# Patient Record
Sex: FEMALE | Race: WHITE | NOT HISPANIC OR LATINO | Employment: FULL TIME | URBAN - METROPOLITAN AREA
[De-identification: names, ages, dates, MRNs, and addresses within clinical notes are randomized per-mention and may not be internally consistent; named-entity substitution may affect disease eponyms.]

---

## 2017-05-30 ENCOUNTER — OFFICE VISIT (OUTPATIENT)
Dept: RETAIL CLINIC | Facility: CLINIC | Age: 27
End: 2017-05-30

## 2017-05-30 VITALS
RESPIRATION RATE: 12 BRPM | DIASTOLIC BLOOD PRESSURE: 74 MMHG | TEMPERATURE: 99.2 F | HEART RATE: 90 BPM | OXYGEN SATURATION: 99 % | SYSTOLIC BLOOD PRESSURE: 104 MMHG

## 2017-05-30 DIAGNOSIS — R05.9 COUGH: ICD-10-CM

## 2017-05-30 DIAGNOSIS — J30.2 SEASONAL ALLERGIC RHINITIS, UNSPECIFIED ALLERGIC RHINITIS TRIGGER: Primary | ICD-10-CM

## 2017-05-30 PROCEDURE — 99213 OFFICE O/P EST LOW 20 MIN: CPT | Performed by: FAMILY MEDICINE

## 2017-05-30 RX ORDER — FLUTICASONE PROPIONATE 50 MCG
2 SPRAY, SUSPENSION (ML) NASAL DAILY
Qty: 1 BOTTLE | Refills: 0
Start: 2017-05-30

## 2017-05-30 RX ORDER — MONTELUKAST SODIUM 10 MG/1
10 TABLET ORAL NIGHTLY
Qty: 90 TABLET | Refills: 0
Start: 2017-05-30 | End: 2020-01-20

## 2017-05-30 RX ORDER — CETIRIZINE HYDROCHLORIDE 10 MG/1
10 TABLET ORAL DAILY
COMMUNITY

## 2018-01-23 ENCOUNTER — HOSPITAL ENCOUNTER (OUTPATIENT)
Dept: LAB | Facility: HOSPITAL | Age: 28
Discharge: HOME | End: 2018-01-23
Attending: INTERNAL MEDICINE | Admitting: INTERNAL MEDICINE

## 2018-01-23 LAB
ALBUMIN SERPL-MCNC: 4.7 G/DL (ref 3.5–4.8)
ALBUMIN/GLOB SERPL: 1.7 {RATIO} (ref 1–1.7)
ALP SERPL-CCNC: 55 IU/L (ref 32–91)
ALT SERPL-CCNC: 16 IU/L (ref 14–54)
ANION GAP SERPL CALC-SCNC: 14.1 MMOL/L (ref 10–20)
AST SERPL-CCNC: 24 IU/L (ref 15–41)
BASOPHILS # BLD AUTO: 0.1 10*3/UL (ref 0–0.2)
BASOPHILS NFR BLD AUTO: 1 % (ref 0–2)
BILIRUB SERPL-MCNC: 0.8 MG/DL (ref 0.3–1.2)
BUN SERPL-MCNC: 10 MG/DL (ref 8–20)
BUN/CREAT SERPL: 14.3 (ref 5.4–26.2)
CALCIUM SERPL-MCNC: 9.6 MG/DL (ref 8.9–10.3)
CHLORIDE SERPL-SCNC: 102 MMOL/L (ref 101–111)
CONV CO2: 26 MMOL/L (ref 22–32)
CONV TOTAL PROTEIN: 7.4 G/DL (ref 6.1–7.9)
CREAT UR-MCNC: 0.7 MG/DL (ref 0.4–1)
DIFFERENTIAL METHOD BLD: (no result)
EOSINOPHIL # BLD AUTO: 0.1 10*3/UL (ref 0–0.3)
EOSINOPHIL # BLD AUTO: 1 % (ref 0–3)
ERYTHROCYTE [DISTWIDTH] IN BLOOD BY AUTOMATED COUNT: 14 % (ref 11.5–14.5)
GLOBULIN UR ELPH-MCNC: 2.7 G/DL (ref 2.5–3.8)
GLUCOSE SERPL-MCNC: 88 MG/DL (ref 65–99)
HCT VFR BLD AUTO: 41 % (ref 35–49)
HGB BLD-MCNC: 13.5 G/DL (ref 12–15)
LYMPHOCYTES # BLD AUTO: 2.4 10*3/UL (ref 0.8–4.8)
LYMPHOCYTES NFR BLD AUTO: 36 % (ref 18–42)
MCH RBC QN AUTO: 26.9 PG (ref 26–32)
MCHC RBC AUTO-ENTMCNC: 33 G/DL (ref 32–36)
MCV RBC AUTO: 81.7 FL (ref 80–94)
MONOCYTES # BLD AUTO: 0.5 10*3/UL (ref 0.1–1.3)
MONOCYTES NFR BLD AUTO: 7 % (ref 2–11)
NEUTROPHILS # BLD AUTO: 3.6 10*3/UL (ref 2.3–8.6)
NEUTROPHILS NFR BLD AUTO: 55 % (ref 50–75)
NRBC BLD AUTO-RTO: 0 /100{WBCS}
NRBC/RBC NFR BLD MANUAL: 0 10*3/UL
PLATELET # BLD AUTO: 246 10*3/UL (ref 150–450)
PMV BLD AUTO: 10.1 FL (ref 7.4–10.4)
POTASSIUM SERPL-SCNC: 4.1 MMOL/L (ref 3.6–5.1)
RBC # BLD AUTO: 5.02 10*6/UL (ref 4–5.4)
SODIUM SERPL-SCNC: 138 MMOL/L (ref 136–144)
WBC # BLD AUTO: 6.7 10*3/UL (ref 4.5–11.5)

## 2018-02-05 ENCOUNTER — HOSPITAL ENCOUNTER (OUTPATIENT)
Dept: CARDIOLOGY | Facility: HOSPITAL | Age: 28
Discharge: HOME | End: 2018-02-05
Attending: INTERNAL MEDICINE | Admitting: INTERNAL MEDICINE

## 2019-05-28 ENCOUNTER — HOSPITAL ENCOUNTER (OUTPATIENT)
Dept: GENERAL RADIOLOGY | Facility: HOSPITAL | Age: 29
Discharge: HOME | End: 2019-05-28
Attending: PHYSICIAN ASSISTANT | Admitting: PHYSICIAN ASSISTANT

## 2020-01-20 ENCOUNTER — OFFICE VISIT (OUTPATIENT)
Dept: SURGERY | Facility: CLINIC | Age: 30
End: 2020-01-20

## 2020-01-20 VITALS
TEMPERATURE: 97.5 F | OXYGEN SATURATION: 99 % | HEIGHT: 66 IN | DIASTOLIC BLOOD PRESSURE: 71 MMHG | HEART RATE: 64 BPM | SYSTOLIC BLOOD PRESSURE: 116 MMHG | BODY MASS INDEX: 26.03 KG/M2 | WEIGHT: 162 LBS

## 2020-01-20 DIAGNOSIS — D22.9 NEVUS: Primary | ICD-10-CM

## 2020-01-20 PROBLEM — M54.16 LUMBAR RADICULOPATHY: Status: ACTIVE | Noted: 2019-05-28

## 2020-01-20 PROCEDURE — 11402 EXC TR-EXT B9+MARG 1.1-2 CM: CPT | Performed by: SURGERY

## 2020-01-20 PROCEDURE — 88305 TISSUE EXAM BY PATHOLOGIST: CPT | Performed by: SURGERY

## 2020-01-20 PROCEDURE — 99201 PR OFFICE OUTPATIENT NEW 10 MINUTES: CPT | Performed by: SURGERY

## 2020-01-20 NOTE — PROGRESS NOTES
Subjective   Taylor Verduzco is a 29 y.o. female.     History of present illness  Taylor is a pleasant 29-year-old seen today for removal of a abnormal nevus.  This been present since she was 15 but has changed recently.  She would like to have it removed.    Past Medical History:   Diagnosis Date   • Depression    • Pneumonia        Past Surgical History:   Procedure Laterality Date   • LAPAROSCOPIC APPENDECTOMY     • LAPAROSCOPIC APPENDECTOMY         Outpatient Encounter Medications as of 1/20/2020   Medication Sig Dispense Refill   • cetirizine (zyrTEC) 10 MG tablet Take 10 mg by mouth Daily.     • fluticasone (FLONASE) 50 MCG/ACT nasal spray 2 sprays into each nostril Daily. Administer 2 sprays in each nostril daily. 1 bottle 0   • levonorgestrel (MIRENA) 20 MCG/24HR IUD 1 each by Intrauterine route 1 (One) Time.     • [DISCONTINUED] montelukast (SINGULAIR) 10 MG tablet Take 1 tablet by mouth Every Night. 90 tablet 0     No facility-administered encounter medications on file as of 1/20/2020.        No Known Allergies    Family History   Problem Relation Age of Onset   • Arthritis Mother    • Heart disease Mother    • Hypertension Mother    • Diabetes Father    • Mental illness Sister        Social History     Socioeconomic History   • Marital status: Single     Spouse name: Not on file   • Number of children: Not on file   • Years of education: Not on file   • Highest education level: Not on file   Tobacco Use   • Smoking status: Never Smoker   • Smokeless tobacco: Never Used   Substance and Sexual Activity   • Alcohol use: Yes   • Drug use: No   • Sexual activity: Yes     Partners: Male       The following portions of the patient's history were reviewed and updated as appropriate: allergies, current medications, past family history, past medical history, past social history, past surgical history and problem list.    Objective       Assessment/Plan   There are no diagnoses linked to this encounter.    In  the office today the area is prepped with Betadine, anesthetized with 1% lidocaine with epinephrine and elliptically excised.  Stitch is  placed at 12:00 for orientation purposes for the pathologist.  3-0 Prolene is used to close the wound.  4 interrupted stitches are required.  She tolerated the procedure well..    Impression: Nevus, 1.1 cm size diameter    Plan: Stitches to stay for 14 days           Victor Manuel Luque,   1/20/2020  4:39 PM

## 2020-01-22 LAB
LAB AP CASE REPORT: NORMAL
LAB AP CLINICAL INFORMATION: NORMAL
PATH REPORT.FINAL DX SPEC: NORMAL
PATH REPORT.GROSS SPEC: NORMAL

## 2020-11-19 ENCOUNTER — APPOINTMENT (OUTPATIENT)
Dept: CT IMAGING | Facility: HOSPITAL | Age: 30
End: 2020-11-19

## 2020-11-19 ENCOUNTER — HOSPITAL ENCOUNTER (EMERGENCY)
Facility: HOSPITAL | Age: 30
Discharge: HOME | End: 2020-11-19
Attending: EMERGENCY MEDICINE | Admitting: EMERGENCY MEDICINE

## 2020-11-19 VITALS
TEMPERATURE: 97.2 F | BODY MASS INDEX: 25.71 KG/M2 | HEART RATE: 90 BPM | WEIGHT: 160 LBS | SYSTOLIC BLOOD PRESSURE: 115 MMHG | HEIGHT: 66 IN | DIASTOLIC BLOOD PRESSURE: 84 MMHG | OXYGEN SATURATION: 98 % | RESPIRATION RATE: 20 BRPM

## 2020-11-19 DIAGNOSIS — S09.90XA INJURY OF HEAD, INITIAL ENCOUNTER: Primary | ICD-10-CM

## 2020-11-19 DIAGNOSIS — S16.1XXA STRAIN OF NECK MUSCLE, INITIAL ENCOUNTER: ICD-10-CM

## 2020-11-19 PROCEDURE — 72125 CT NECK SPINE W/O DYE: CPT

## 2020-11-19 PROCEDURE — 70450 CT HEAD/BRAIN W/O DYE: CPT

## 2020-11-19 PROCEDURE — 99283 EMERGENCY DEPT VISIT LOW MDM: CPT

## 2020-11-19 RX ORDER — ACETAMINOPHEN 500 MG
1000 TABLET ORAL ONCE
Status: COMPLETED | OUTPATIENT
Start: 2020-11-19 | End: 2020-11-19

## 2020-11-19 RX ADMIN — ACETAMINOPHEN 1000 MG: 500 TABLET, FILM COATED ORAL at 21:44

## 2020-11-20 NOTE — ED PROVIDER NOTES
EMERGENCY DEPARTMENT ENCOUNTER    Room Number:  39/39  Date of encounter:  11/19/2020  PCP: Provider, No Known  Historian: Patient      HPI:  Chief Complaint: Head injury and neck injury concerned about a concussion  A complete HPI/ROS/PMH/PSH/SH/FH are unobtainable due to: Not applicable  Context: Taylor Verduzco is a 30 y.o. female who presents to the ED c/o injury to the patient's head and neck.  Patient was lifting up a couch and her body was an angle the couch went down and it caused her body to rotate around the couch with a whiplash injury which she thinks she might hit her head mildly against the seat of the couch.  With the arm of the couch hitting her upper abdomen and lower chest.  She has no pain in her abdomen or chest that 100% resolved.  She has had persistent throbbing headache.  She also has a mild pain in the back of her upper portion of her neck.  She has no motor or sensory changes.  No visual change.  Complains of some photophobia she states that she feels a little bit dizzy.  She has no visual change, speech change, she is able to stand and walk without any problems.  She is concerned of the whiplash injury that she has a concussion.  She does not take any blood thinning medicine.        Previous Episodes: No  Current Symptoms: See above    MEDICAL HISTORY REVIEWED        PAST MEDICAL HISTORY  Active Ambulatory Problems     Diagnosis Date Noted   • Lumbar radiculopathy 05/28/2019   • Nevus 01/20/2020     Resolved Ambulatory Problems     Diagnosis Date Noted   • No Resolved Ambulatory Problems     Past Medical History:   Diagnosis Date   • Asthma    • Depression    • Pneumonia          PAST SURGICAL HISTORY  Past Surgical History:   Procedure Laterality Date   • LAPAROSCOPIC APPENDECTOMY           FAMILY HISTORY  Family History   Problem Relation Age of Onset   • Arthritis Mother    • Heart disease Mother    • Hypertension Mother    • Diabetes Father    • Mental illness Sister           SOCIAL HISTORY  Social History     Socioeconomic History   • Marital status: Single     Spouse name: Not on file   • Number of children: Not on file   • Years of education: Not on file   • Highest education level: Not on file   Tobacco Use   • Smoking status: Never Smoker   • Smokeless tobacco: Never Used   Substance and Sexual Activity   • Alcohol use: Yes   • Drug use: No   • Sexual activity: Yes     Partners: Male         ALLERGIES  Patient has no known allergies.        REVIEW OF SYSTEMS  Review of Systems     All systems reviewed and negative except for those discussed in HPI.       PHYSICAL EXAM    I have reviewed the triage vital signs and nursing notes.    ED Triage Vitals [11/19/20 2035]   Temp Heart Rate Resp BP SpO2   97.2 °F (36.2 °C) 90 20 115/84 98 %      Temp src Heart Rate Source Patient Position BP Location FiO2 (%)   Oral -- -- -- --       GENERAL: Female no acute distress.Vital signs on my initial evaluation unremarkable  HENT: nares patent  Head/neck/ face are symmetric without gross deformity, signs of trauma, or swelling patient no signs of trauma to the head or neck.  EYES: no scleral icterus, no conjunctival pallor.  NECK: Supple, no meningismus she does point to some pain at the very top portion at the base of her occipital scalp.  No focal midline cervical spine tenderness.  CV: regular rhythm, regular rate with intact distal pulses.  RESPIRATORY: normal effort and no respiratory distress.  ABDOMEN: soft and non-tender.  He has no pain in palpation of the chest abdomen and pelvis.  MUSCULOSKELETAL: no deformity.  NEURO: alert and appropriate, moves all extremities, follows commands.  Note and oriented x3.  No focal motor or sensory changes.  Patient's gait when she ambulated back to the room was normal without any unsteadiness or ataxia.  SKIN: warm, dry    Vital signs and nursing notes reviewed.        LAB RESULTS  No results found for this or any previous visit (from the past 24  hour(s)).    Ordered the above labs and independently reviewed the results.        RADIOLOGY  Ct Head Without Contrast    Result Date: 11/19/2020  CT HEAD WITHOUT CONTRAST  HISTORY: Persistent throbbing headache following trauma  COMPARISON: None available.  TECHNIQUE: Axial CT imaging was obtained through the brain. No IV contrast was administered.  FINDINGS: No acute intracranial hemorrhage is seen. The ventricles are normal in size. There is no midline shift or mass effect. No calvarial fracture is seen. The paranasal sinuses and mastoid air cells appear clear.      No acute intracranial findings.  Radiation dose reduction techniques were utilized, including automated exposure control and exposure modulation based on body size.  This report was finalized on 11/19/2020 10:45 PM by Dr. Renee Smith M.D.      Ct Cervical Spine Without Contrast    Result Date: 11/19/2020  CT CERVICAL SPINE WITHOUT CONTRAST   HISTORY: Trauma. Possible whiplash injury.  COMPARISON: None available.  TECHNIQUE: Axial CT imaging was obtained through the cervical spine. Coronal and sagittal reformatted images were obtained.  FINDINGS: No acute fracture or subluxation of the cervical spine is identified. There is some reversal/straightening of normal cervical curvature. This may be related to patient positioning, although some muscle spasm is not excluded. There is no prevertebral soft tissue swelling. There may be some mild canal narrowing at C6-C7. There is some minimal neural foraminal narrowing noted bilaterally, right greater than left.  The thyroid gland trachea appear unremarkable. Images through the lung apices do not demonstrate any acute abnormalities.      No acute fracture or subluxation identified. There is some reversal/screening of normal cervical curvature. Appearance may be related to patient positioning, although some degree of muscle spasm is not excluded. Radiation dose reduction techniques were utilized, including  automated exposure control and exposure modulation based on body size.  This report was finalized on 11/19/2020 10:50 PM by Dr. Renee Smith M.D.        I ordered the above noted radiological studies. Reviewed by me and discussed with radiologist.  See dictation for official radiology interpretation.      PROCEDURES    Procedures      MEDICATIONS GIVEN IN ER    Medications   acetaminophen (TYLENOL) tablet 1,000 mg (1,000 mg Oral Given 11/19/20 5267)         PROGRESS, DATA ANALYSIS, CONSULTS, AND MEDICAL DECISION MAKING    We will check a CT scan of her head and neck.  Think she might of had a whiplash injury.  Patient also is requesting a CT scan as well.  We are currently under a pandemic from the COVID19 infection.  The patient presented to the emergency department by ambulance or personal vehicle. I followed the current protocols required by Infection Control at [facility] in my evaluation and treatment of the patient. The patient was wearing a face mask during my evaluation and throughout my encounter. During my whole encounter with this patient I used appropriate personal protective equipment.  This equipment consisted of eye protection, facemask, gown, and gloves.  I applied this equipment before entering the room.      All labs have been independently reviewed by me.  All radiology studies have been reviewed by me and discussed with radiologist dictating the report.   EKG's independently viewed and interpreted by me.  Discussion below represents my analysis of pertinent findings related to patient's condition, differential diagnosis, treatment plan and final disposition.      ED Course as of Nov 19 2257   Thu Nov 19, 2020 2255 CT scan of the head and cervical spine were unremarkable.  I reviewed the report from the radiologist.  Please see dictated report for complete dictation.    [MM]   2256 Patient is resting comfortably.  When I go back to the room patient is sleeping.  I woke her  and informed her the results of the CT scan of the head and neck.  She states that she is feeling better.  All questions answered.    [MM]      ED Course User Index  [MM] Edilson Chase MD       AS OF 22:57 EST VITALS:    BP - 115/84  HR - 90  TEMP - 97.2 °F (36.2 °C) (Oral)  02 SATS - 98%        DIAGNOSIS  Final diagnoses:   Injury of head, initial encounter   Strain of neck muscle, initial encounter         DISPOSITION  DISCHARGE    Patient discharged in stable condition.    Reviewed implications of results, diagnosis, meds, responsibility to follow up, warning signs and symptoms of possible worsening, potential complications and reasons to return to ER, including worsening of symptoms, worsening of headache, visual change, speech change, weakness in extremity, any concerns..    Patient/Family voiced understanding of above instructions.    Discussed plan for discharge, as there is no emergent indication for admission. Pt/family is agreeable and understands need for follow up and repeat testing.  Pt is aware that discharge does not mean that nothing is wrong but it indicates no emergency is present that requires admission and they must continue care with follow-up as given below or physician of their choice.     FOLLOW-UP  PATIENT LIAISON [facility] [id number] [phone]    Follow-up with your physician in the next week or call the number above to pain and new primary care provider in follow-up in the next 1 to 2 weeks., Return if pain worsens, If symptoms worsen, shortness of breath, any concerns, fever         Medication List      No changes were made to your prescriptions during this visit.                  Edilson Chase MD  11/19/20 1921       Edilson Chase MD  11/19/20 6539

## 2020-11-20 NOTE — ED TRIAGE NOTES
.Pt masked on arrival, staff masked    Pt reports fell earlier today, landed on chest/ribs but concerned for concussion d/t jaw/head pain

## 2020-11-20 NOTE — DISCHARGE INSTRUCTIONS
Take Tylenol or Motrin for pain.  Apply warm compresses to upper neck to help relax smooth muscles.  Return if any weakness to upper extremities or lower extremities, visual change, speech change, worsening of condition or any concerns.

## 2021-04-16 ENCOUNTER — BULK ORDERING (OUTPATIENT)
Dept: CASE MANAGEMENT | Facility: OTHER | Age: 31
End: 2021-04-16

## 2021-04-16 DIAGNOSIS — Z23 IMMUNIZATION DUE: ICD-10-CM

## 2021-04-20 ENCOUNTER — APPOINTMENT (OUTPATIENT)
Dept: WOMENS IMAGING | Facility: HOSPITAL | Age: 31
End: 2021-04-20

## 2021-04-20 PROCEDURE — G0279 TOMOSYNTHESIS, MAMMO: HCPCS | Performed by: RADIOLOGY

## 2021-04-20 PROCEDURE — 77062 BREAST TOMOSYNTHESIS BI: CPT | Performed by: RADIOLOGY

## 2021-04-20 PROCEDURE — 77066 DX MAMMO INCL CAD BI: CPT | Performed by: RADIOLOGY

## 2021-04-20 PROCEDURE — 76641 ULTRASOUND BREAST COMPLETE: CPT | Performed by: RADIOLOGY

## 2021-04-28 ENCOUNTER — TELEPHONE (OUTPATIENT)
Dept: SURGERY | Facility: CLINIC | Age: 31
End: 2021-04-28

## 2021-04-28 NOTE — TELEPHONE ENCOUNTER
New patient appointment with Nicol Baldwin NP is scheduled on 6/28/2021 @ 3:30pm.    Called and spoke to patient, patient expressed v/u of appointment time.    Sent patient a reminder letter in the mail.

## 2021-05-19 ENCOUNTER — TELEPHONE (OUTPATIENT)
Dept: SURGERY | Facility: CLINIC | Age: 31
End: 2021-05-19

## 2021-05-19 NOTE — TELEPHONE ENCOUNTER
Patient called back tor rescehdule appointment with Nicol Baldwin NP.    Appointment is scheduled on 7/26/2021 @ 3:00pm,    Patient expressed v/u of appointment time.    Sent patient a new set of paperwork in mail.

## 2021-07-27 ENCOUNTER — OFFICE VISIT (OUTPATIENT)
Dept: SURGERY | Facility: CLINIC | Age: 31
End: 2021-07-27

## 2021-07-27 VITALS
BODY MASS INDEX: 27.64 KG/M2 | WEIGHT: 172 LBS | SYSTOLIC BLOOD PRESSURE: 115 MMHG | DIASTOLIC BLOOD PRESSURE: 76 MMHG | HEART RATE: 63 BPM | OXYGEN SATURATION: 98 % | HEIGHT: 66 IN

## 2021-07-27 DIAGNOSIS — R92.2 BREAST DENSITY: ICD-10-CM

## 2021-07-27 DIAGNOSIS — N60.11 FIBROCYSTIC BREAST CHANGES, BILATERAL: Primary | ICD-10-CM

## 2021-07-27 DIAGNOSIS — N60.12 FIBROCYSTIC BREAST CHANGES, BILATERAL: Primary | ICD-10-CM

## 2021-07-27 PROBLEM — R92.30 BREAST DENSITY: Status: ACTIVE | Noted: 2021-07-27

## 2021-07-27 PROCEDURE — 99203 OFFICE O/P NEW LOW 30 MIN: CPT | Performed by: NURSE PRACTITIONER

## 2021-07-27 RX ORDER — SERTRALINE HYDROCHLORIDE 100 MG/1
100 TABLET, FILM COATED ORAL DAILY
COMMUNITY
Start: 2021-05-27

## 2021-07-27 NOTE — PROGRESS NOTES
[facility]     Referring Provider: Candi Wiseman MD     Chief complaint: breast mass. Right breast.      HPI: Ms. Taylor Verduzco is a 30 yo woman, seen at the request of Candi Wiseman MD, for right breast lump    I personally reviewed her records and summarized her relevant breast history/imaging:    She has no personal history of breast procedures and denies any family history of breast or ovarian cancer.     3/26/2021: Clinic visit with CHAVEZ Ruiz  Presents for well woman preventative exam.  Reports breast lump/mass occurring for 1 day.  The lump/mass is located in the right breast.  There are no changes to the nipple.  With exam 12 to 13 mm, mobile, nonerythematous mass right upper outer quadrant.  Covid vaccine was in January.  Advised patient most breast lumps are benign.  Follow-up pending results of imaging.    4/20/2021: [facility] with tom[facility]ynthesis[facility]  Breasts are heterogeneously dense.  Finding 1: There are areas of asymmetric breast tissue seen in both breast.  No suspicious masses, suspicious microcalcifications or architectural distortions are identified.  There is no mammographic evidence of malignancy.  Finding 2: The patient indicates palpable lump or thickening in the upper outer region of the right breast at 1030, 10 cm from the nipple.  The symptomatic region is clearly marked there is no suspicious finding in the region of clinical concern.  Right breast complete ultrasound  Finding 2: Normal-appearing subcutaneous fat lobule measuring approximately 2 cm is noted in the area of concern.  There is no evidence of any solid mass or abnormal cystic element at this site, or in the remainder of the right breast.  Finding 3: There is an oval small intramammary lymph node with well-defined, thin margins measuring 9 x 3 x 8 mm seen in 1030 o'clock region of the right breast located 8 cm from the  nipple.  This is an incidental, nonpalpable finding.  All 4 quadrants the breast, axilla, retroareolar region were imaged.  Impression:  Finding 1: Areas of asymmetric breast tissue in both breasts are benign negative.  Finding 2: Area in the right breast is benign negative.  In view of these findings clinical follow-up is recommended as needed.  Finding 3: Intramammary lymph node in the right breast is benign negative.  Screening mammogram age 40 is recommended.  BI-RADS Category 2      Today she presents with an unchanged right breast nodule, non tender.  She denies any breast pain, skin changes, or nipple discharge.  She denies any prior history of abnormal mammograms or breast biopsies.   She reports that she is otherwise healthy and does not take any medications.   She was by herself in clinic today.       Review of Systems   Neurological: Positive for headaches.   Hematological: Bruises/bleeds easily.   Psychiatric/Behavioral: Positive for depression. The patient is nervous/anxious.    All other systems reviewed and are negative.      Medications:    Current Outpatient Medications:   •  cetirizine (zyrTEC) 10 MG tablet, Take 10 mg by mouth Daily., Disp: , Rfl:   •  fluticasone (FLONASE) 50 MCG/ACT nasal spray, 2 sprays into each nostril Daily. Administer 2 sprays in each nostril daily., Disp: 1 bottle, Rfl: 0  •  levonorgestrel (MIRENA) 20 MCG/24HR IUD, 1 each by Intrauterine route 1 (One) Time., Disp: , Rfl:   •  sertraline (ZOLOFT) 100 MG tablet, Take 100 mg by mouth Daily., Disp: , Rfl:     Allergies:  No Known Allergies    Medical history:  Past Medical History:   Diagnosis Date   • Asthma    • Depression    • Pneumonia        Surgical History:  Past Surgical History:   Procedure Laterality Date   • LAPAROSCOPIC APPENDECTOMY         Family History:  Family History   Problem Relation Age of Onset   • Arthritis Mother    • Heart disease Mother    • Hypertension Mother    • Diabetes Father    • Mental illness  Sister    • Kidney cancer Paternal Grandmother        Social History:   Social History     Socioeconomic History   • Marital status: Single     Spouse name: Not on file   • Number of children: Not on file   • Years of education: Not on file   • Highest education level: Not on file   Tobacco Use   • Smoking status: Never Smoker   • Smokeless tobacco: Never Used   Substance and Sexual Activity   • Alcohol use: Yes   • Drug use: No   • Sexual activity: Yes     Partners: Male     Patient drinks 2 servings of caffeine per day.       GYNECOLOGIC HISTORY:   . P: 0. AB: 0.  Last menstrual period: Has IUD.   Age at menarche: 13  Age at first childbirth: n/a  Lactation/How long: n/a  Age at menopause: n/a  Total years of oral contraceptive use: 5  Total years of hormone replacement therapy: n/a      Physical Exam  Vitals:    21 1451   BP: 115/76   Pulse: 63   SpO2: 98%     ECOG 0 - Asymptomatic  General: NAD, well appearing  Psych: a&o x 3, normal mood and affect  Eyes: EOMI, no scleral icterus  ENMT: neck supple without masses or thyromegaly, mucus membranes moist  Resp: normal effort, CTAB  CV: RRR, no murmurs, no edema   GI: soft, NT, ND  MSK: normal gait, normal ROM in bilateral shoulders  Lymph nodes:  no cervical, supraclavicular or axillary lymphadenopathy  Breast: symmetric, diffusely nodular bilaterally  Right:  No visible abnormalities on inspection while seated, with arms raised or hands on hips. No masses, skin changes, or nipple abnormalities.  At the area of concern, UOQ, 1000, 9 CFN is a smooth nodule, 1 cm, nontender.  Left:  No visible abnormalities on inspection while seated, with arms raised or hands on hips. No masses, skin changes, or nipple abnormalities.      Assessment:    1)  31 y.o. F with a palpable area of concern right breast, negative diagnostic imaging    2)  Benign breast changes    3)  Breast density    Discussion:  1)  Imaging findings were reviewed with the patient.    2)  We  discussed fibrocystic change and how this is benign and can sometimes be associated with increased breast density. I reassured her today that she had a normal clinical breast exam and recent normal bilateral imaging. I explained that cords of dense breast tissue or focal areas of fibrocystic change can sometimes feel like a lump on exam. This is common in women like her with heterogeneous and nodular tissue. I encouraged her to become familiar with her own self-exam over the next few months to establish a personal baseline.    3)  Breast density describes how the breasts look on a mammogram.  Breast and connective tissue are denser than fat and this difference shows up on the mammogram.  Young women often have dense breasts.  As we age, breast become less dense.  Dense breast can make it harder to find breast cancer on the mammogram.  Women with high breast density have an increased risk of breast cancer.  Educational materials regarding breast density were given and reviewed.  Tomosynthesis imaging will be completed with next screening study.      Plan:    In view of her normal imaging and examination I will not give her a follow-up in our office.  She is followed by her referring at this juncture.  Screening mammogram with tomosynthesis at age 41.    I have advised the patient to continue monthly breast self examination and to notify us if she develops new breast symptoms.       CHAVEZ Quiles           CC:  Candi Wiseman MD  Provider, No Known    EMR Dragon/transcription disclaimer:  Dictated using Dragon dictation

## 2021-09-02 ENCOUNTER — APPOINTMENT (OUTPATIENT)
Dept: GENERAL RADIOLOGY | Facility: HOSPITAL | Age: 31
End: 2021-09-02

## 2021-09-02 ENCOUNTER — HOSPITAL ENCOUNTER (EMERGENCY)
Facility: HOSPITAL | Age: 31
Discharge: HOME | End: 2021-09-02
Admitting: EMERGENCY MEDICINE

## 2021-09-02 VITALS
SYSTOLIC BLOOD PRESSURE: 114 MMHG | TEMPERATURE: 98.3 F | WEIGHT: 176.15 LBS | RESPIRATION RATE: 16 BRPM | HEART RATE: 81 BPM | DIASTOLIC BLOOD PRESSURE: 81 MMHG | HEIGHT: 67 IN | BODY MASS INDEX: 27.65 KG/M2 | OXYGEN SATURATION: 100 %

## 2021-09-02 DIAGNOSIS — M25.571 ACUTE RIGHT ANKLE PAIN: ICD-10-CM

## 2021-09-02 DIAGNOSIS — S99.911A INJURY OF RIGHT ANKLE, INITIAL ENCOUNTER: Primary | ICD-10-CM

## 2021-09-02 PROCEDURE — 99283 EMERGENCY DEPT VISIT LOW MDM: CPT

## 2021-09-02 PROCEDURE — 73610 X-RAY EXAM OF ANKLE: CPT

## 2021-09-02 PROCEDURE — 25010000002 KETOROLAC TROMETHAMINE PER 15 MG: Performed by: NURSE PRACTITIONER

## 2021-09-02 PROCEDURE — 96372 THER/PROPH/DIAG INJ SC/IM: CPT

## 2021-09-02 RX ORDER — HYDROCODONE BITARTRATE AND ACETAMINOPHEN 5; 325 MG/1; MG/1
1 TABLET ORAL EVERY 6 HOURS PRN
Qty: 3 TABLET | Refills: 0 | Status: SHIPPED | OUTPATIENT
Start: 2021-09-02

## 2021-09-02 RX ORDER — KETOROLAC TROMETHAMINE 30 MG/ML
30 INJECTION, SOLUTION INTRAMUSCULAR; INTRAVENOUS ONCE
Status: COMPLETED | OUTPATIENT
Start: 2021-09-02 | End: 2021-09-02

## 2021-09-02 RX ADMIN — KETOROLAC TROMETHAMINE 30 MG: 30 INJECTION, SOLUTION INTRAMUSCULAR; INTRAVENOUS at 21:37

## 2021-09-03 ENCOUNTER — HOSPITAL ENCOUNTER (OUTPATIENT)
Dept: MRI IMAGING | Facility: HOSPITAL | Age: 31
Discharge: HOME | End: 2021-09-03
Admitting: NURSE PRACTITIONER

## 2021-09-03 ENCOUNTER — TRANSCRIBE ORDERS (OUTPATIENT)
Dept: ADMINISTRATIVE | Facility: HOSPITAL | Age: 31
End: 2021-09-03

## 2021-09-03 DIAGNOSIS — M25.571 RIGHT ANKLE PAIN, UNSPECIFIED CHRONICITY: ICD-10-CM

## 2021-09-03 DIAGNOSIS — M25.571 RIGHT ANKLE PAIN, UNSPECIFIED CHRONICITY: Primary | ICD-10-CM

## 2021-09-03 PROCEDURE — 73721 MRI JNT OF LWR EXTRE W/O DYE: CPT

## 2021-09-03 NOTE — ED PROVIDER NOTES
Subjective    Chief Complaint   Patient presents with   • Ankle Injury     Provider, No Known  No LMP recorded. Patient has had an implant.  No Known Allergies    History of Present Illness  Patient is a 31-year-old female presents emergency department with complaint of right ankle pain.  Patient ports she was walking, twisted her ankle.  Denies any pain in the foot.  No numbness or tingling.    Patient denies any possibility of pregnancy.  She has Mirena IUD.  Review of Systems   Constitutional: Negative for chills and fever.   Musculoskeletal:        Right ankle pain   Skin: Negative for color change and wound.       Past Medical History:   Diagnosis Date   • Asthma    • Depression    • Pneumonia        No Known Allergies    Past Surgical History:   Procedure Laterality Date   • LAPAROSCOPIC APPENDECTOMY         Family History   Problem Relation Age of Onset   • Arthritis Mother    • Heart disease Mother    • Hypertension Mother    • Diabetes Father    • Mental illness Sister    • Kidney cancer Paternal Grandmother        Social History     Socioeconomic History   • Marital status: Single     Spouse name: Not on file   • Number of children: Not on file   • Years of education: Not on file   • Highest education level: Not on file   Tobacco Use   • Smoking status: Never Smoker   • Smokeless tobacco: Never Used   Substance and Sexual Activity   • Alcohol use: Yes   • Drug use: No   • Sexual activity: Yes     Partners: Male           Objective   Physical Exam  Vitals and nursing note reviewed.   Constitutional:       General: She is not in acute distress.     Appearance: Normal appearance. She is not ill-appearing, toxic-appearing or diaphoretic.   Eyes:      Extraocular Movements: Extraocular movements intact.      Conjunctiva/sclera: Conjunctivae normal.      Pupils: Pupils are equal, round, and reactive to light.   Cardiovascular:      Rate and Rhythm: Normal rate and regular rhythm.      Heart sounds: Normal heart  "sounds.   Pulmonary:      Effort: Pulmonary effort is normal.   Musculoskeletal:      Right ankle: Swelling present. Tenderness present. Normal range of motion. Normal pulse.      Left ankle: Normal pulse.        Feet:       Comments: DP pulses 2+ bilaterally.    Skin:     General: Skin is warm and dry.      Capillary Refill: Capillary refill takes less than 2 seconds.      Findings: No erythema or rash.   Neurological:      General: No focal deficit present.      Mental Status: She is alert and oriented to person, place, and time.   Psychiatric:         Mood and Affect: Mood normal.         Behavior: Behavior normal.         Procedures           ED Course      /81 (BP Location: Left arm, Patient Position: Sitting)   Pulse 81   Temp 98.3 °F (36.8 °C) (Oral)   Resp 16   Ht 170.2 cm (67\")   Wt 79.9 kg (176 lb 2.4 oz)   SpO2 100%   BMI 27.59 kg/m²   Labs Reviewed - No data to display  Medications   ketorolac (TORADOL) injection 30 mg (30 mg Intramuscular Given 9/2/21 2137)     XR Ankle 3+ View Right    Result Date: 9/2/2021  1. Negative for evidence of acute fracture or dislocation.  Electronically Signed By-Tracy Ruelas MD On:9/2/2021 9:42 PM This report was finalized on [id number] by  Tracy Ruelas MD.         Patient INSPECT report queried and reviewed.  I discussed with the patient the risk and benefits associated with opiate/narcotic pain medication today.  Based on patient's exam findings and assessment, I do feel it appropriate to prescribe a short course of opiate/ narcotic pain medication from the emergency department.  The patient was advised of the risks associated with such medication, including risk of dependency.  Patient was advised of medication administration instructions, appropriate use, potential side effects and adverse reactions.  Acknowledged and verbalized understanding, and agrees to treatment.                                MDM  Number of Diagnoses or Management Options   "   Amount and/or Complexity of Data Reviewed  Tests in the radiology section of CPT®: reviewed      Appropriate PPE was worn during the duration of the care for this patient while in the emergency department per [facility] Policy    ----Differentials: Sprain, occult fracture, contusion  This list is not all inclusive and does not constitute the entireity of considered causes.     ----Lab and imaging reviewd by me, imaging interpreted per radiologist and independly viewed by me:     No acute fracture noted on x-ray.    ED  Course----Patient was brought back to the emergency department room for evaluation and placed on appropriate monitoring.      Patient has no acute fracture on x-ray.  However given swelling, mechanism of injury, diffuse tenderness in the ankle, patient was placed in Ace wrap, given crutches, as be nonweightbearing and follow-up for reexam and possible imaging.  Reports she will see Dr. Wiseman.  He was given information for Ortho as well as Dr. Wong.  Discussed pain medication, patient feels that she can take Tylenol and ibuprofen, but if the pain is severe, only request 3 hydrocodone until follow-up.    Vital signs have  been reviewed. Patient is afebrile. Non toxic in appearance. Alert and oriented to person, place, time and situation.    I spoke with the patient at the bedside regarding their plan of care, discharge instruction, home care, prescriptions, and importance follow-up.  We discussed test results at the bedside, including incidental abnormal labs, radiological findings, understands need for follow-up with primary care or specialist if indicated.      Patient was made aware of indications to return to the emergency department.  Patient agrees with the current plan of care for discharge, verbalized understanding of all instructions    Pt is aware that discharge does not mean that nothing is wrong but it indicates no emergency is present and they must continue care with follow-up as  given below or physician of their choice                          Final diagnoses:   Injury of right ankle, initial encounter   Acute right ankle pain       ED Disposition  ED Disposition     ED Disposition Condition Comment    Discharge Stable           [facility] EMERGENCY DEPARTMENT  [address] [address] [phone]    As needed, If symptoms worsen    PATIENT CONNECTION - [facility] [address]  [phone]  Schedule an appointment as soon as possible for a visit   Call for assistance with follow up with Primary care provider-call tomorrow.    EMELIA Wong DPM  [address]  [place] [address]  [phone]          Bry Damon MD  [address]  [place] [facility]  [phone]               Medication List      New Prescriptions    HYDROcodone-acetaminophen 5-325 MG per tablet  Commonly known as: NORCO  Take 1 tablet by mouth Every 6 (Six) Hours As Needed for Moderate Pain .           Where to Get Your Medications      These medications were sent to [id number] - [place], [place] - [address] AT [facility] [phone] [facility] [phone] [place] [address], [facility] [address]    Phone: [phone]   · HYDROcodone-acetaminophen 5-325 MG per tablet          Candi Jay, APRN  09/03/21 0034

## 2021-09-03 NOTE — DISCHARGE INSTRUCTIONS
Please follow-up with your primary care provider, call tomorrow for an appointment, if you do not have a primary care provider, please use the patient connection above to us to help establish care, please call as soon as possible.    Return the emergency department for new or worsening symptoms    Take medications as prescribed, any changes will be noted on your discharge instruction    Ice to area 3-4 times per day, 15-20 minutes at a time, do not use while sleeping or for extended periods of time.     Please follow-up with Orthopedic provider as above or of choice, call for an appointment tomorrow    Use crutches for no weightbearing    Keep Ace wrap in place

## 2025-03-17 ENCOUNTER — ON CAMPUS - OUTPATIENT (OUTPATIENT)
Dept: URBAN - METROPOLITAN AREA HOSPITAL 2 | Facility: HOSPITAL | Age: 35
End: 2025-03-17
Payer: COMMERCIAL

## 2025-03-17 ENCOUNTER — OFFICE (OUTPATIENT)
Dept: URBAN - METROPOLITAN AREA PATHOLOGY 19 | Facility: PATHOLOGY | Age: 35
End: 2025-03-17
Payer: COMMERCIAL

## 2025-03-17 VITALS
RESPIRATION RATE: 14 BRPM | SYSTOLIC BLOOD PRESSURE: 128 MMHG | RESPIRATION RATE: 15 BRPM | HEART RATE: 86 BPM | SYSTOLIC BLOOD PRESSURE: 106 MMHG | RESPIRATION RATE: 24 BRPM | DIASTOLIC BLOOD PRESSURE: 69 MMHG | HEIGHT: 66 IN | DIASTOLIC BLOOD PRESSURE: 81 MMHG | TEMPERATURE: 98.1 F | SYSTOLIC BLOOD PRESSURE: 116 MMHG | DIASTOLIC BLOOD PRESSURE: 80 MMHG | SYSTOLIC BLOOD PRESSURE: 121 MMHG | RESPIRATION RATE: 18 BRPM | DIASTOLIC BLOOD PRESSURE: 77 MMHG | RESPIRATION RATE: 16 BRPM | WEIGHT: 178 LBS | DIASTOLIC BLOOD PRESSURE: 68 MMHG | SYSTOLIC BLOOD PRESSURE: 95 MMHG | HEART RATE: 79 BPM | HEART RATE: 72 BPM | OXYGEN SATURATION: 100 % | HEART RATE: 60 BPM | DIASTOLIC BLOOD PRESSURE: 90 MMHG | HEART RATE: 78 BPM

## 2025-03-17 DIAGNOSIS — K31.89 OTHER DISEASES OF STOMACH AND DUODENUM: ICD-10-CM

## 2025-03-17 DIAGNOSIS — K44.9 DIAPHRAGMATIC HERNIA WITHOUT OBSTRUCTION OR GANGRENE: ICD-10-CM

## 2025-03-17 DIAGNOSIS — R10.13 EPIGASTRIC PAIN: ICD-10-CM

## 2025-03-17 DIAGNOSIS — K21.9 GASTRO-ESOPHAGEAL REFLUX DISEASE WITHOUT ESOPHAGITIS: ICD-10-CM

## 2025-03-17 DIAGNOSIS — R13.10 DYSPHAGIA, UNSPECIFIED: ICD-10-CM

## 2025-03-17 PROBLEM — K29.70 GASTRITIS, UNSPECIFIED, WITHOUT BLEEDING: Status: ACTIVE | Noted: 2025-03-17

## 2025-03-17 PROCEDURE — 88305 TISSUE EXAM BY PATHOLOGIST: CPT | Performed by: PATHOLOGY

## 2025-03-17 PROCEDURE — 43239 EGD BIOPSY SINGLE/MULTIPLE: CPT | Performed by: INTERNAL MEDICINE

## 2025-03-17 PROCEDURE — 43450 DILATE ESOPHAGUS 1/MULT PASS: CPT | Performed by: INTERNAL MEDICINE

## 2025-03-17 RX ORDER — ESOMEPRAZOLE MAGNESIUM 40 MG/1
40 CAPSULE, DELAYED RELEASE ORAL
Qty: 90 | Refills: 4 | Status: ACTIVE
Start: 2025-03-17

## 2025-03-18 ENCOUNTER — OFFICE VISIT (OUTPATIENT)
Dept: FAMILY MEDICINE CLINIC | Facility: CLINIC | Age: 35
End: 2025-03-18
Payer: COMMERCIAL

## 2025-03-18 VITALS
WEIGHT: 174 LBS | HEIGHT: 67 IN | TEMPERATURE: 98 F | OXYGEN SATURATION: 100 % | BODY MASS INDEX: 27.31 KG/M2 | HEART RATE: 60 BPM | DIASTOLIC BLOOD PRESSURE: 76 MMHG | SYSTOLIC BLOOD PRESSURE: 122 MMHG

## 2025-03-18 DIAGNOSIS — Z13.0 SCREENING FOR DEFICIENCY ANEMIA: ICD-10-CM

## 2025-03-18 DIAGNOSIS — M54.16 LUMBAR RADICULOPATHY: ICD-10-CM

## 2025-03-18 DIAGNOSIS — F41.9 ANXIETY: ICD-10-CM

## 2025-03-18 DIAGNOSIS — K21.9 GASTROESOPHAGEAL REFLUX DISEASE, UNSPECIFIED WHETHER ESOPHAGITIS PRESENT: ICD-10-CM

## 2025-03-18 DIAGNOSIS — L40.9 PSORIASIS: ICD-10-CM

## 2025-03-18 DIAGNOSIS — Z13.6 SCREENING FOR ISCHEMIC HEART DISEASE: ICD-10-CM

## 2025-03-18 DIAGNOSIS — J45.20 MILD INTERMITTENT ASTHMA WITHOUT COMPLICATION: Primary | ICD-10-CM

## 2025-03-18 DIAGNOSIS — F51.02 ADJUSTMENT INSOMNIA: ICD-10-CM

## 2025-03-18 PROBLEM — D22.9 NEVUS: Status: RESOLVED | Noted: 2020-01-20 | Resolved: 2025-03-18

## 2025-03-18 PROBLEM — R92.30 BREAST DENSITY: Status: RESOLVED | Noted: 2021-07-27 | Resolved: 2025-03-18

## 2025-03-18 PROCEDURE — 99204 OFFICE O/P NEW MOD 45 MIN: CPT | Performed by: STUDENT IN AN ORGANIZED HEALTH CARE EDUCATION/TRAINING PROGRAM

## 2025-03-18 RX ORDER — OMEPRAZOLE 40 MG/1
40 CAPSULE, DELAYED RELEASE ORAL DAILY
Qty: 90 CAPSULE | Refills: 3 | Status: SHIPPED | OUTPATIENT
Start: 2025-03-18

## 2025-03-18 RX ORDER — CYCLOBENZAPRINE HCL 5 MG
5 TABLET ORAL 3 TIMES DAILY PRN
Qty: 30 TABLET | Refills: 2 | Status: SHIPPED | OUTPATIENT
Start: 2025-03-18

## 2025-03-18 RX ORDER — ALBUTEROL SULFATE 90 UG/1
1-2 INHALANT RESPIRATORY (INHALATION)
COMMUNITY
Start: 2025-01-29

## 2025-03-18 RX ORDER — MAGNESIUM GLUCONATE 27 MG(500)
500 TABLET ORAL 2 TIMES DAILY
COMMUNITY

## 2025-03-18 NOTE — PROGRESS NOTES
Chief Complaint  Establish Care (Yes to manuela.), Med Management, and Insomnia    Subjective        Taylor Verduzco presents to [facility] PRIMARY [facility]  History of Present Illness       34-year-old female with history of anxiety/depression, GERD, history of neck/back spasms who presents to establish care today.    She has been experiencing insomnia, which she attributes to anxiety related to current life stressors -remodeling her basement. She has a history of anxiety, which was previously managed with therapy and Zoloft for approximately 1.5 to 2 years. She discontinued Zoloft after feeling better but experienced emotional breakdowns when she became a . She then took Prozac for 1 to 2 months, discontinuing it after the foster placement ended. She has been managing well since then. Her current insomnia is not chronic but is characterized by difficulty falling asleep, although she sleeps well once she does. 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     She also takes Flexeril and magnesium gluconate for muscle spasms and sleep issues.    She experienced migraines from January to February, which were not debilitating but persistent. She has an appointment with a neurologist this afternoon, Dr. Julián Botello. The migraines resolved after she was treated with steroids for the flu about 1.5 months ago. She describes the headaches as being located at her temples, with a mix of shooting pain and chronic ache. She did not experience any sinus symptoms but did have instances where one eye would become fuzzy while reading.  They seemed to be exacerbated/brought on by eye prescription change.      She had her first asthma attack during her bout with the flu, although she had previously been diagnosed with exercise-induced asthma.  She manages with as needed albuterol.  During her bout with the flu, she experienced  "shortness of breath and was prescribed steroids and an inhaler, which resolved her symptoms.    She has psoriasis and was prescribed a topical NSAID for use as needed, a steroid for flare-ups, and tretinoin.    She has a gynecologist and has had an IUD placed. She had all her blood work done including A1c, CBC, CMP, hemoglobin A1c, ferritin, CBC, lipid profile, HPV and Pap smear on 09/21/2023. She is planning to get a colonoscopy next year. She followed up with breast surgery after the COVID-19 vaccine due to a lump, but the mammogram looked fine. She is up to date with dentist.    She had an EGD yesterday due to acid reflux. They took some biopsies for H. pylori and found a small hiatal hernia. She was taking omeprazole but has been off it for about a month. She was taking 40 mg twice a day when her symptoms were severe, but now takes 40 mg once a day for maintenance.    FAMILY HISTORY  Her father has diabetes, and his entire side of the family has diabetes. Her mother side of the family has a history of massive MIs by the time they are 60. Her paternal grandmother had kidney cancer at age 100. Her paternal aunt had colorectal cancer at age 65. Her mother side of the family has bad joints, gout, hypertension, and heart issues. Her mother was addicted to painkillers. Her father might have idiopathic cardiomyopathy and has had an ICD placed due to an enlarged heart. Her sister has migraines, autoimmune condition, anorexia, and depression.    MEDICATIONS  Current: magnesium gluconate  Past: Zoloft, Prozac, Flexeril, omeprazole    IMMUNIZATIONS  She received her influenza vaccine in September 2023.       Objective   Vital Signs:  /76   Pulse 60   Temp 98 °F (36.7 °C)   Ht 170.2 cm (67\")   Wt 78.9 kg (174 lb)   SpO2 100%   BMI 27.25 kg/m²   Estimated body mass index is 27.25 kg/m² as calculated from the following:    Height as of this encounter: 170.2 cm (67\").    Weight as of this encounter: 78.9 kg (174 " lb).      Physical Exam  Constitutional:       General: She is not in acute distress.  Eyes:      Conjunctiva/sclera: Conjunctivae normal.   Cardiovascular:      Rate and Rhythm: Normal rate and regular rhythm.   Pulmonary:      Effort: Pulmonary effort is normal. No respiratory distress.   Abdominal:      Palpations: Abdomen is soft.      Tenderness: There is no abdominal tenderness.   Neurological:      Mental Status: She is alert and oriented to person, place, and time.   Psychiatric:         Mood and Affect: Mood normal.         Behavior: Behavior normal.        Result Review :  The following data was reviewed by: Elayne Arreguin MD on 03/18/2025:    Data reviewed : see HPI           Assessment and Plan   Diagnoses and all orders for this visit:    1. Mild intermittent asthma without complication (Primary)    2. Gastroesophageal reflux disease, unspecified whether esophagitis present  -     omeprazole (priLOSEC) 40 MG capsule; Take 1 capsule by mouth Daily.  Dispense: 90 capsule; Refill: 3    3. Lumbar radiculopathy  -     cyclobenzaprine (FLEXERIL) 5 MG tablet; Take 1 tablet by mouth 3 (Three) Times a Day As Needed for Muscle Spasms (Pain).  Dispense: 30 tablet; Refill: 2    4. Adjustment insomnia    5. Screening for deficiency anemia  -     Cancel: CBC Auto Differential  -     CBC Auto Differential; Future    6. Screening for ischemic heart disease  -     Cancel: Comprehensive Metabolic Panel  -     Cancel: TSH  -     Cancel: Lipid Panel  -     Comprehensive Metabolic Panel; Future  -     Lipid Panel; Future  -     ORDER: Hemoglobin A1c; Future  -     TSH; Future    7. Psoriasis    8. Anxiety    Other orders  -     Cancel: ORDER: Hemoglobin A1c           1. Insomnia.  The patient reports difficulty falling asleep, which she attributes to anxiety and work-related stress. She is advised to continue taking magnesium gluconate for its potential benefits in managing sleep disturbances.    2. Anxiety.  The patient  has a history of anxiety, previously managed with Zoloft and Prozac during stressful periods. Currently, she is not on any anxiety medication but experiences anxiety-related insomnia. She is advised to continue non-pharmacological interventions such as therapy and stress management techniques.    3. Migraines.  The patient experienced migraines earlier this year, which were alleviated with steroids during a flu episode. She has a neurology appointment scheduled for further evaluation. She is advised to keep this appointment and ensure that the neurologist sends a copy of the results to this office.    4. Asthma.  The patient has a history of exercise-induced asthma and experienced a severe asthma attack during a recent flu episode. She was prescribed an inhaler and steroids, which resolved the attack. She should continue to monitor her symptoms and use the inhaler as needed.    5. Psoriasis.  The patient has been prescribed a topical NSAID and a steroid cream for flare-ups. She is also using tretinoin for skin maintenance. She should continue these treatments as directed and follow up with dermatology as needed.    6. Acid Reflux.  The patient has a history of acid reflux and recently underwent an EGD, which revealed a small hiatal hernia. Biopsies for H. pylori were taken, and results are pending. A prescription for omeprazole 40 mg once daily will be sent to [facility].    7. Health Maintenance.  The patient is up to date with her dental visits and has had recent lab work done in September 2023, including hemoglobin A1c, CMP, ferritin, CBC, lipid profile, HPV, and Pap smear. Given her family history of diabetes and heart disease, yearly lab work is recommended. Fasting labs will be scheduled, including CMP, CBC, lipid panel, A1c, TSH.    PROCEDURE  EGD was performed yesterday due to acid reflux.            Follow Up   No follow-ups on file.  Patient was given instructions and counseling regarding her condition or  for health maintenance advice. Please see specific information pulled into the AVS if appropriate.     Patient or patient representative verbalized consent for the use of Ambient Listening during the visit with  Elayne Arreguin MD for chart documentation. 3/18/2025  09:50 EDT

## 2025-03-19 DIAGNOSIS — Z13.0 SCREENING FOR DEFICIENCY ANEMIA: ICD-10-CM

## 2025-03-19 DIAGNOSIS — Z13.6 SCREENING FOR ISCHEMIC HEART DISEASE: ICD-10-CM

## 2025-03-20 LAB
ALBUMIN SERPL-MCNC: 4.3 G/DL (ref 3.5–5.2)
ALBUMIN/GLOB SERPL: 1.7 G/DL
ALP SERPL-CCNC: 75 U/L (ref 39–117)
ALT SERPL-CCNC: 13 U/L (ref 1–33)
AST SERPL-CCNC: 21 U/L (ref 1–32)
BASOPHILS # BLD AUTO: 0.06 10*3/MM3 (ref 0–0.2)
BASOPHILS NFR BLD AUTO: 1.1 % (ref 0–1.5)
BILIRUB SERPL-MCNC: 0.4 MG/DL (ref 0–1.2)
BUN SERPL-MCNC: 9 MG/DL (ref 6–20)
BUN/CREAT SERPL: 11 (ref 7–25)
CALCIUM SERPL-MCNC: 9.2 MG/DL (ref 8.6–10.5)
CHLORIDE SERPL-SCNC: 106 MMOL/L (ref 98–107)
CHOLEST SERPL-MCNC: 152 MG/DL (ref 0–200)
CO2 SERPL-SCNC: 26.9 MMOL/L (ref 22–29)
CREAT SERPL-MCNC: 0.82 MG/DL (ref 0.57–1)
EGFRCR SERPLBLD CKD-EPI 2021: 96.4 ML/MIN/1.73
EOSINOPHIL # BLD AUTO: 0.07 10*3/MM3 (ref 0–0.4)
EOSINOPHIL NFR BLD AUTO: 1.3 % (ref 0.3–6.2)
ERYTHROCYTE [DISTWIDTH] IN BLOOD BY AUTOMATED COUNT: 13 % (ref 12.3–15.4)
GLOBULIN SER CALC-MCNC: 2.6 GM/DL
GLUCOSE SERPL-MCNC: 85 MG/DL (ref 65–99)
HBA1C MFR BLD: 5.3 % (ref 4.8–5.6)
HCT VFR BLD AUTO: 46.3 % (ref 34–46.6)
HDLC SERPL-MCNC: 47 MG/DL (ref 40–60)
HGB BLD-MCNC: 14.6 G/DL (ref 12–15.9)
IMM GRANULOCYTES # BLD AUTO: 0 10*3/MM3 (ref 0–0.05)
IMM GRANULOCYTES NFR BLD AUTO: 0 % (ref 0–0.5)
LDLC SERPL CALC-MCNC: 90 MG/DL (ref 0–100)
LYMPHOCYTES # BLD AUTO: 1.97 10*3/MM3 (ref 0.7–3.1)
LYMPHOCYTES NFR BLD AUTO: 37 % (ref 19.6–45.3)
MCH RBC QN AUTO: 27.8 PG (ref 26.6–33)
MCHC RBC AUTO-ENTMCNC: 31.5 G/DL (ref 31.5–35.7)
MCV RBC AUTO: 88 FL (ref 79–97)
MONOCYTES # BLD AUTO: 0.7 10*3/MM3 (ref 0.1–0.9)
MONOCYTES NFR BLD AUTO: 13.2 % (ref 5–12)
NEUTROPHILS # BLD AUTO: 2.52 10*3/MM3 (ref 1.7–7)
NEUTROPHILS NFR BLD AUTO: 47.4 % (ref 42.7–76)
NRBC BLD AUTO-RTO: 0 /100 WBC (ref 0–0.2)
PLATELET # BLD AUTO: 261 10*3/MM3 (ref 140–450)
POTASSIUM SERPL-SCNC: 4.2 MMOL/L (ref 3.5–5.2)
PROT SERPL-MCNC: 6.9 G/DL (ref 6–8.5)
RBC # BLD AUTO: 5.26 10*6/MM3 (ref 3.77–5.28)
SODIUM SERPL-SCNC: 142 MMOL/L (ref 136–145)
TRIGL SERPL-MCNC: 77 MG/DL (ref 0–150)
TSH SERPL DL<=0.005 MIU/L-ACNC: 1.66 UIU/ML (ref 0.27–4.2)
VLDLC SERPL CALC-MCNC: 15 MG/DL (ref 5–40)
WBC # BLD AUTO: 5.32 10*3/MM3 (ref 3.4–10.8)

## 2025-04-09 ENCOUNTER — PATIENT MESSAGE (OUTPATIENT)
Dept: FAMILY MEDICINE CLINIC | Facility: CLINIC | Age: 35
End: 2025-04-09
Payer: COMMERCIAL

## 2025-04-09 DIAGNOSIS — R73.9 ELEVATED BLOOD SUGAR: Primary | ICD-10-CM

## 2025-04-09 DIAGNOSIS — E66.3 OVERWEIGHT WITH BODY MASS INDEX (BMI) OF 27 TO 27.9 IN ADULT: ICD-10-CM

## 2025-04-10 RX ORDER — PROCHLORPERAZINE 25 MG/1
SUPPOSITORY RECTAL
Qty: 3 EACH | Refills: 11 | Status: SHIPPED | OUTPATIENT
Start: 2025-04-10

## 2025-04-10 RX ORDER — PROCHLORPERAZINE 25 MG/1
1 SUPPOSITORY RECTAL
Qty: 1 EACH | Refills: 4 | Status: SHIPPED | OUTPATIENT
Start: 2025-04-10

## 2025-04-10 RX ORDER — PROCHLORPERAZINE 25 MG/1
1 SUPPOSITORY RECTAL ONCE
Qty: 1 EACH | Refills: 0 | Status: SHIPPED | OUTPATIENT
Start: 2025-04-10 | End: 2025-04-10

## 2025-07-01 DIAGNOSIS — E66.3 OVERWEIGHT: Primary | ICD-10-CM

## 2025-07-01 RX ORDER — ACYCLOVIR 400 MG/1
1 TABLET ORAL
Qty: 3 EACH | Refills: 5 | Status: SHIPPED | OUTPATIENT
Start: 2025-07-01

## 2025-07-01 RX ORDER — ACYCLOVIR 400 MG/1
1 TABLET ORAL ONCE
Qty: 1 EACH | Refills: 0 | Status: SHIPPED | OUTPATIENT
Start: 2025-07-01 | End: 2025-07-01

## 2025-08-06 DIAGNOSIS — M54.16 LUMBAR RADICULOPATHY: ICD-10-CM

## 2025-08-06 RX ORDER — CYCLOBENZAPRINE HCL 5 MG
5 TABLET ORAL 3 TIMES DAILY PRN
Qty: 30 TABLET | Refills: 2 | Status: SHIPPED | OUTPATIENT
Start: 2025-08-06